# Patient Record
Sex: MALE | URBAN - METROPOLITAN AREA
[De-identification: names, ages, dates, MRNs, and addresses within clinical notes are randomized per-mention and may not be internally consistent; named-entity substitution may affect disease eponyms.]

---

## 2022-01-27 ENCOUNTER — OFFICE VISIT (OUTPATIENT)
Dept: URBAN - METROPOLITAN AREA CLINIC 33 | Facility: CLINIC | Age: 52
End: 2022-01-27
Payer: COMMERCIAL

## 2022-01-27 DIAGNOSIS — H52.13 MYOPIA, BILATERAL: Primary | ICD-10-CM

## 2022-01-27 DIAGNOSIS — D48.1 NEOPLASM OF UNCERTAIN BEHAVIOR OF CONNECTIVE TISSUE OF EYELID: ICD-10-CM

## 2022-01-27 PROCEDURE — 92004 COMPRE OPH EXAM NEW PT 1/>: CPT | Performed by: OPTOMETRIST

## 2022-01-27 ASSESSMENT — INTRAOCULAR PRESSURE
OD: 20
OS: 18

## 2022-01-27 ASSESSMENT — VISUAL ACUITY
OD: 20/40
OS: 20/50

## 2022-01-27 NOTE — IMPRESSION/PLAN
Impression: Neoplasm of uncertain behavior of connective tissue of eyelid: D48.1. Plan: Black lesions on eyelids RUL x 2 and SARA x 2. Patient reports they started about one year ago and are growing. Recommend referral to oculoplastics for evaluation of large eyelid lesions. Patient reports having a few moles removed from his stomach and back that showed to be non-cancerous.